# Patient Record
Sex: FEMALE | Race: WHITE | Employment: PART TIME | URBAN - METROPOLITAN AREA
[De-identification: names, ages, dates, MRNs, and addresses within clinical notes are randomized per-mention and may not be internally consistent; named-entity substitution may affect disease eponyms.]

---

## 2018-04-17 ENCOUNTER — HOSPITAL ENCOUNTER (OUTPATIENT)
Dept: PHYSICAL THERAPY | Age: 38
Discharge: HOME | End: 2018-04-17
Payer: COMMERCIAL

## 2018-04-17 PROCEDURE — 97110 THERAPEUTIC EXERCISES: CPT

## 2018-04-17 PROCEDURE — 97161 PT EVAL LOW COMPLEX 20 MIN: CPT

## 2018-04-18 NOTE — PROGRESS NOTES
PHYSICAL THERAPY - DAILY TREATMENT NOTE    Patient Name: Aura Polk        Date: 2018  : 1980   YES Patient  Verified  Visit #:     Insurance: Payor: Walker Lopez / Plan:  [address] [facility] / Product Type: Commerical /      In time: 4 P Out time: 5 P   Total Treatment Time: 55     Medicare Time Tracking (below)   Total Timed Codes (min):  NA 1:1 Treatment Time:  NA     TREATMENT AREA =  Low back pain [M54.5]    SUBJECTIVE  Pain Level (on 0 to 10 scale):  1  / 10   Medication Changes/New allergies or changes in medical history, any new surgeries or procedures? NO    If yes, update Summary List   Subjective Functional Status/Changes:  []  No changes reported     See POC          OBJECTIVE  Modalities Rationale:    PD   min [] Estim, type/location:                                      []  att     []  unatt     []  w/US     []  w/ice    []  w/heat    min []  Mechanical Traction: type/lbs                   []  pro   []  sup   []  int   []  cont    []  before manual    []  after manual    min []  Ultrasound, settings/location:      min []  Iontophoresis w/ dexamethasone, location:                                               []  take home patch       []  in clinic    min []  Ice     []  Heat    location/position:     min []  Vasopneumatic Device, press/temp:     min []  Other:    [] Skin assessment post-treatment (if applicable):    []  intact    []  redness- no adverse reaction     []redness  adverse reaction:        15 min Therapeutic Exercise:  [x]  See flow sheet   Rationale:      increase ROM and increase strength to improve the patients ability to return to pain-free sitting      min Manual Therapy:    Rationale:          min Therapeutic Activity:    Rationale:         min Neuromuscular Re-ed:    Rationale:       min Gait Training:    Rationale:       min Patient Education:  YES  Reviewed HEP   []  Progressed/Changed HEP based on:         Other Objective/Functional Measures:    See POC     Post Treatment Pain Level (on 0 to 10) scale:   1  / 10     ASSESSMENT  Assessment/Changes in Function:     See POC     []  See Progress Note/Recertification   Patient will continue to benefit from skilled PT services to modify and progress therapeutic interventions, address functional mobility deficits, address ROM deficits, address strength deficits and instruct in home and community integration to attain remaining goals.    Progress toward goals / Updated goals:    Progressing towards goals established at [address] [facility] [id number]  [x]  Upgrade activities as tolerated YES Continue plan of care   []  Discharge due to :    []  Other:      Therapist: Mann Sousa PT    Date: 4/17/2018 Time: 5:01 PM     Future Appointments  Date Time Provider Odalis Hernandez   4/23/2018 10:00 AM Mann oSusa PT [facility] [place]   4/30/2018 12:30 PM Mann Sousa PT [facility] [place]   5/7/2018 10:30 AM Mann Sousa PT [facility] [place]   5/14/2018 10:30 AM Mann Sousa PT [facility] [place]

## 2018-04-23 ENCOUNTER — HOSPITAL ENCOUNTER (OUTPATIENT)
Dept: PHYSICAL THERAPY | Age: 38
Discharge: HOME | End: 2018-04-23
Payer: COMMERCIAL

## 2018-04-23 PROCEDURE — 97535 SELF CARE MNGMENT TRAINING: CPT

## 2018-04-23 PROCEDURE — 97110 THERAPEUTIC EXERCISES: CPT

## 2018-04-23 NOTE — PROGRESS NOTES
PHYSICAL THERAPY - DAILY TREATMENT NOTE    Patient Name: Tammi Rueda        Date: 2018  : 1980   YES Patient  Verified  Visit #:     Insurance: Payor: Danielle Moncada / Plan: [address] [place] [address] Rd PT / Product Type: Commerical /      In time: 10 A Out time: 10:55 A   Total Treatment Time: 50/35     Medicare Time Tracking (below)   Total Timed Codes (min):  NA 1:1 Treatment Time:  NA     TREATMENT AREA =  Low back pain [M54.5]    SUBJECTIVE  Pain Level (on 0 to 10 scale):  3  / 10   Medication Changes/New allergies or changes in medical history, any new surgeries or procedures?     NO    If yes, update Summary List   Subjective Functional Status/Changes:  []  No changes reported     Patient reports that the numbness in her L foot is down to her 5th toe & is much better but she is having more pain in her lower back         OBJECTIVE  Modalities Rationale:     decrease pain to improve patient's ability to return to pain-free work activities   min [] Estim, type/location:                                      []  att     []  unatt     []  w/US     []  w/ice    []  w/heat    min []  Mechanical Traction: type/lbs                   []  pro   []  sup   []  int   []  cont    []  before manual    []  after manual    min []  Ultrasound, settings/location:      min []  Iontophoresis w/ dexamethasone, location:                                               []  take home patch       []  in clinic   10 min [x]  Ice     []  Heat    location/position: Supine to l/s with 2 wedges     min []  Vasopneumatic Device, press/temp:     min []  Other:    [] Skin assessment post-treatment (if applicable):    []  intact    []  redness- no adverse reaction     []redness  adverse reaction:        30/  25 min Therapeutic Exercise:  [x]  See flow sheet   Rationale:      increase ROM and increase strength to improve the patients ability to return to light lifting      min Manual Therapy:    Rationale:       10 min Self Care: Postural ed, overslouch correct, BM& positioning with in standing with performing pt care   Rationale:    increase proprioception to improve the patients ability to return to prolonged standing at work      min Neuromuscular Re-ed:    Rationale:        min Gait Training:    Rationale:       min Patient Education:  YES  Reviewed HEP   []  Progressed/Changed HEP based on: Other Objective/Functional Measures: Added neural mobilizations     Post Treatment Pain Level (on 0 to 10) scale:   1  / 10     ASSESSMENT  Assessment/Changes in Function:     Improved tolerance to supine N glide vs lion stretch in quad, lion stretch MS L LE sx into lateral foot as well at 1st MTP, RWAR (although abolished with RFIL), supine N glide MS sx into L LE slightly, lateral border, NWAR     []  See Progress Note/Recertification   Patient will continue to benefit from skilled PT services to modify and progress therapeutic interventions, address functional mobility deficits, address ROM deficits, address strength deficits, address imbalance/dizziness and instruct in home and community integration to attain remaining goals.    Progress toward goals / Updated goals:    Progressing towards LTGs     PLAN  [x]  Upgrade activities as tolerated YES Continue plan of care   []  Discharge due to :    []  Other:      Therapist: Michelle Mccarthy PT    Date: 4/23/2018 Time: 2:05 PM     Future Appointments  Date Time Provider Odalis Hernandez   4/30/2018 12:30 PM [place]ise[place]da [place] [facility] [place]   5/7/2018 10:30 AM Michelle Mcc[place] [facility] [place]   5/14/2018 10:30 AM Michelle Mccarthy [place] [facility] [place]

## 2018-04-30 ENCOUNTER — APPOINTMENT (OUTPATIENT)
Dept: PHYSICAL THERAPY | Age: 38
End: 2018-04-30
Payer: COMMERCIAL

## 2018-05-07 ENCOUNTER — APPOINTMENT (OUTPATIENT)
Dept: PHYSICAL THERAPY | Age: 38
End: 2018-05-07

## 2018-06-07 NOTE — PROGRESS NOTES
[address] [facility]  [facility]  [facility], [address] [place] [address], [address]  Phone: [phone]  Fax: [phone] SUMMARY  Patient Name: Triny Aparicio : 1980   Treatment/Medical Diagnosis: Low back pain [M54.5]   Referral Source: Jane Green MD     Date of Initial Visit: 18 Attended Visits: 2 Missed Visits: 1     SUMMARY OF TREATMENT  Therapeutic exercise including ROM, stretching, gentle strengthening, stabilization training, nerve glides, postural ed, patient education, HEP instruction, CP. CURRENT STATUS  Ms. Issac Olmstead was seen for 1 f/u treatment & was unable to re-assessed prior to DC. She reported pain level at 3/10 & had reported c/o numbness into 5th toe on L LE had improved although slight increase in LBP. No further contact has been made with clinic to continue with PT, therefore patient to be discharged to home program.    Goal/Measure of Progress Goal Met? 1.  Establish HEP to prevent further disability. Status at last Eval: NA Current Status: HEP established  yes   2. Patient will report decreased c/o pain to < or = 4-5/10 at worst to facilitate prolonged sitting with manageable sx in l/s. Status at last Eval: 7-8/10 at worst Current Status: Unable to be re-assessed n/a   3. Patient will be able to demonstrate the appropriate sitting posture with or without use of l/s roll to facilitate sitting activities at home/work. Status at last Eval: unable Current Status: Unable to be re-assessed n/a   4. Improve FOTO score from 50 points to > or = 60 points indicating improved tolerance with ADLs in regards to l/s. Status at last Eval: 50 Current Status: Unable to be re-assessed n/a     RECOMMENDATIONS  Other: DC, self DC    If you have any questions/comments please contact us directly at [phone]. Thank you for allowing us to assist in the care of your patient.     Therapist Signature: Kaity Oakley DONATO Elise, cert MDT Date: 5-44-06     Time: 2:17 PM
